# Patient Record
Sex: MALE | Race: WHITE | NOT HISPANIC OR LATINO | URBAN - NONMETROPOLITAN AREA
[De-identification: names, ages, dates, MRNs, and addresses within clinical notes are randomized per-mention and may not be internally consistent; named-entity substitution may affect disease eponyms.]

---

## 2023-11-10 ENCOUNTER — CLAIMS CREATED FROM THE CLAIM WINDOW (OUTPATIENT)
Dept: URBAN - NONMETROPOLITAN AREA CLINIC 11 | Facility: CLINIC | Age: 58
End: 2023-11-10

## 2023-11-10 ENCOUNTER — LAB OUTSIDE AN ENCOUNTER (OUTPATIENT)
Dept: URBAN - NONMETROPOLITAN AREA CLINIC 11 | Facility: CLINIC | Age: 58
End: 2023-11-10

## 2023-11-10 ENCOUNTER — OFFICE VISIT (OUTPATIENT)
Dept: URBAN - NONMETROPOLITAN AREA CLINIC 11 | Facility: CLINIC | Age: 58
End: 2023-11-10

## 2023-11-10 ENCOUNTER — DASHBOARD ENCOUNTERS (OUTPATIENT)
Age: 58
End: 2023-11-10

## 2023-11-10 VITALS
DIASTOLIC BLOOD PRESSURE: 79 MMHG | HEART RATE: 76 BPM | HEIGHT: 67 IN | SYSTOLIC BLOOD PRESSURE: 153 MMHG | WEIGHT: 142 LBS | OXYGEN SATURATION: 97 % | BODY MASS INDEX: 22.29 KG/M2 | TEMPERATURE: 98.1 F

## 2023-11-10 DIAGNOSIS — Z86.010 PERSONAL HISTORY OF COLONIC POLYPS: ICD-10-CM

## 2023-11-10 DIAGNOSIS — R14.0 ABDOMINAL BLOATING: ICD-10-CM

## 2023-11-10 DIAGNOSIS — R13.12 OROPHARYNGEAL DYSPHAGIA: ICD-10-CM

## 2023-11-10 DIAGNOSIS — R93.3 ABNORMAL DIGESTIVE SYSTEM DIAGNOSTIC IMAGING: ICD-10-CM

## 2023-11-10 PROBLEM — 71457002: Status: ACTIVE | Noted: 2023-11-10

## 2023-11-10 PROBLEM — 428283002: Status: ACTIVE | Noted: 2023-11-10

## 2023-11-10 PROBLEM — 116289008: Status: ACTIVE | Noted: 2023-11-10

## 2023-11-10 PROCEDURE — 99204 OFFICE O/P NEW MOD 45 MIN: CPT | Performed by: NURSE PRACTITIONER

## 2023-11-10 PROCEDURE — 99204 OFFICE O/P NEW MOD 45 MIN: CPT | Performed by: INTERNAL MEDICINE

## 2023-11-10 RX ORDER — BACLOFEN 20 MG/1
1 1/2 TABLET WITH FOOD OR MILK TABLET ORAL THREE TIMES A DAY
Refills: 0 | Status: ACTIVE | COMMUNITY

## 2023-11-10 RX ORDER — DICLOFENAC SODIUM 75 MG/1
1 TABLET TABLET, DELAYED RELEASE ORAL ONCE A DAY
Qty: 90 TABLET | Status: ACTIVE | COMMUNITY

## 2023-11-10 RX ORDER — SIMETHICONE 80 MG/1
1 TABLET AFTER MEALS AND AT BEDTIME AS NEEDED TABLET, CHEWABLE ORAL
Status: ACTIVE | COMMUNITY

## 2023-11-10 RX ORDER — FINASTERIDE 5 MG/1
1 TABLET TABLET, FILM COATED ORAL ONCE A DAY
Qty: 90 TABLET | Status: ACTIVE | COMMUNITY

## 2023-11-10 NOTE — HPI-TODAY'S VISIT:
58 year old male presents for evaluation of abdominal bloating. Abdominal bloating worsens thorugh the day. The patient follows a low [employer] diet. Pancreatic elastase was normal. CT abd/pelvis 10/2023 showed mild esophageal wall thickening, right inguinal hernia, minimal colonic wall thickening, and shotty abdominopelvic nodes for which he was seen by oncology with a negative evaluation.  In 2016, he was diagnosed with Cerebal Palsy. The patient was told swallowing issues and a chronic cough were neurological. Swallow study in 2016 showed dysmotility. He is on a soft diet and if not, he has dysphagia to solids. The patient drinks his own protein shakes. Denies GERD or abdominal pain. 2012 he had post prandial loose stools, bloating, flatus. Celiac panel showed a sensitivity and followed a gluten free diet. Now, he has bowel movements daily. His paternal grandmother had colon cancer. His last colonoscopy was in 2022 and he had colon polyps. His father had a GIST tumor and colon polyps.

## 2024-03-22 ENCOUNTER — EGD (OUTPATIENT)
Dept: URBAN - NONMETROPOLITAN AREA MEDICAL CENTER 6 | Facility: MEDICAL CENTER | Age: 59
End: 2024-03-22

## 2025-01-28 ENCOUNTER — P2P PATIENT RECORD (OUTPATIENT)
Age: 60
End: 2025-01-28

## 2025-01-29 ENCOUNTER — LAB OUTSIDE AN ENCOUNTER (OUTPATIENT)
Dept: URBAN - METROPOLITAN AREA CLINIC 44 | Facility: CLINIC | Age: 60
End: 2025-01-29

## 2025-01-29 ENCOUNTER — TELEPHONE ENCOUNTER (OUTPATIENT)
Dept: URBAN - METROPOLITAN AREA CLINIC 44 | Facility: CLINIC | Age: 60
End: 2025-01-29

## 2025-01-29 VITALS — HEIGHT: 67 IN | BODY MASS INDEX: 21.97 KG/M2 | WEIGHT: 140 LBS

## 2025-01-29 PROBLEM — 312824007: Status: ACTIVE | Noted: 2025-01-29

## 2025-03-11 ENCOUNTER — ERX REFILL RESPONSE (OUTPATIENT)
Dept: URBAN - METROPOLITAN AREA CLINIC 46 | Facility: CLINIC | Age: 60
End: 2025-03-11

## 2025-03-11 ENCOUNTER — TELEPHONE ENCOUNTER (OUTPATIENT)
Dept: URBAN - METROPOLITAN AREA SURGERY CENTER 28 | Facility: SURGERY CENTER | Age: 60
End: 2025-03-11

## 2025-03-11 RX ORDER — PANTOPRAZOLE SODIUM 40 MG/1
1 TABLET 1/2 TO 1 HOUR BEFORE MORNING MEAL TABLET, DELAYED RELEASE ORAL ONCE A DAY
Qty: 30 | Refills: 0 | OUTPATIENT

## 2025-03-11 RX ORDER — PANTOPRAZOLE SODIUM 40 MG/1
1 TABLET 1/2 TO 1 HOUR BEFORE MORNING MEAL TABLET, DELAYED RELEASE ORAL ONCE A DAY
Qty: 30 | Refills: 0 | OUTPATIENT
Start: 2025-03-11

## 2025-03-11 RX ORDER — PANTOPRAZOLE SODIUM 40 MG/1
TAKE 1 TABLET BY MOUTH 1/2 TO 1 HOUR BEFORE MORNING MEAL ONCE A DAY TABLET, DELAYED RELEASE ORAL
Qty: 90 TABLET | Refills: 0 | OUTPATIENT

## 2025-06-10 ENCOUNTER — ERX REFILL RESPONSE (OUTPATIENT)
Dept: URBAN - METROPOLITAN AREA CLINIC 46 | Facility: CLINIC | Age: 60
End: 2025-06-10

## 2025-06-10 RX ORDER — PANTOPRAZOLE SODIUM 40 MG/1
TAKE 1 TABLET BY MOUTH 1/2 TO 1 HOUR BEFORE MORNING MEAL ONCE A DAY TABLET, DELAYED RELEASE ORAL
Qty: 90 TABLET | Refills: 0